# Patient Record
Sex: MALE | Race: BLACK OR AFRICAN AMERICAN | NOT HISPANIC OR LATINO | Employment: OTHER | URBAN - METROPOLITAN AREA
[De-identification: names, ages, dates, MRNs, and addresses within clinical notes are randomized per-mention and may not be internally consistent; named-entity substitution may affect disease eponyms.]

---

## 2017-01-27 ENCOUNTER — ALLSCRIPTS OFFICE VISIT (OUTPATIENT)
Dept: OTHER | Facility: OTHER | Age: 42
End: 2017-01-27

## 2017-06-27 ENCOUNTER — ALLSCRIPTS OFFICE VISIT (OUTPATIENT)
Dept: OTHER | Facility: OTHER | Age: 42
End: 2017-06-27

## 2017-11-28 ENCOUNTER — ALLSCRIPTS OFFICE VISIT (OUTPATIENT)
Dept: OTHER | Facility: OTHER | Age: 42
End: 2017-11-28

## 2017-11-29 NOTE — PROGRESS NOTES
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)   2  Bipolar disorder (manic depression) (296 80) (F31 9)   3  Weight loss (783 21) (R63 4)    Plan  Bipolar disorder (manic depression)    · Follow-up visit in 4 Months Evaluation and Treatment  Follow-up  Status: Hold For -Scheduling  Requested for: 30RIU0660   Ordered;Bipolar disorder (manic depression); Ordered By: Letitia Ch Performed:  Due: 63ILG6059    Discussion/Summary  Discussion Summary:   Patient is overall doing well  His exam remains stable  No chroea noted  Gait is mildly ataxic  He had one fall since the last visit however he attributes this to an issue with his shoe  He is still functioning well at his facility  He feels that his mood is stable  He is noted to have some weight loss since the last visit however it is still improved compared to 9/2016 when he was down to 180 pounds  He denies any issues with dysphagia  Recommend that he have Ensure or Boost supplements daily  If he continues to lose weight despite the supplements may have psychiatry consider increasing his Risperidone dose versus switching to Olanzapine  He was also encouraged to remain both physically and mentally stimulated  the case and plan with Dr Nataliia Greenwood  Counseling Documentation With Imm: The patient was counseled regarding instructions for management,-- prognosis,-- patient and family education,-- impressions,-- risks and benefits of treatment options  total time of encounter was 30 minutes-- and-- 16 minutes was spent counseling  Chief Complaint  Chief Complaint Free Text Note Form: Patient presents for a neurological follow up for [place] Disease      History of Present Illness  HPI: Rico Stinson is a 36year old [address] [place] [address] resident with Huntingtonâs disease diagnosed in 11/2013 after hospitalization for mental status changes, who presents for follow up   To review, he was admitted to the psychiatry unit Aug 14-23 for hopelessness, delirium and suicidal ideation and was seen by Dr Yamileth Azul who noted abnormal movements which were thought to be neuroleptic induced but Huntingtonâs disease was considered at that time  He was readmitted and seen by Dr Lemuel Canela 8/30/13 with a reported history of falls over a year, dysarthria and drooling  Given aggression in the past his risperidone was switched to Geodon and HD testing was ordered with 45 CAG repeats  In the past he had issues with weight loss and we and recommended having the psychiatrist consider either adding a low dose of Mirtazapine to boost his appetite or switching risperidone to olanzapine for possible weight gain  his last visit he was overall functioning well with no chorea  His gait was mildly ataxic with no recent fall  No changes were made and he remains on clonazepam 0 5mg 1tab bid (8:30am, 1:30pm) and 1mg qhs, risperidone 1 5mg q8:30am and 2mg qhs, benztropine 0 5mg bid, and carbamazepine 200mg bid and 100mg qhs   remains at [facility]  He continues to lose some weight  He is now down to 190 pounds however this is still improved compared to 9/2016 when he was 180 pounds  He denies any dysphagia  He states that he is eating well and did not think he had lost any weight  He is doing well from a mood standpoint as well  He still follows with psychiatry every month  He likes to watch sports and play chess at his facility  He had one fall since the last visit which he attributes to an issue with his shoe  No other falls  He is sleeping well at night  He is able to dress and shower on his own  He denies any concerns  his ROS, FH, Sh and social history  Review of Systems  Neurological ROS:  Constitutional: no fever, no chills, no recent weight gain, no recent weight loss, no complaints of feeling tired, no changes in appetite    HEENT:  no sinus problems, not feeling congested, no blurred vision, no dryness of the eyes, no eye pain, no hearing loss, no tinnitus, no mouth sores, no sore throat, no hoarseness, no dysphagia, no masses, no bleeding  Cardiovascular:  no chest pain or pressure, no palpitations present, the heart rate was not rapid or irregular, no swelling in the arms or legs, no poor circulation  Respiratory:  no unusual or persistant cough, no shortness of breath with or without exertion  Gastrointestinal:  no nausea, no vomiting, no diarrhea, no abdominal pain, no changes in bowel habits, no melena, no loss of bowel control  Genitourinary:  no incontinence, no feelings of urinary urgency, no increase in frequency, no urinary hesitancy, no dysuria, no hematuria  Musculoskeletal:  no arthralgias, no myalgias, no immobility or loss of function, no head/neck/back pain, no pain while walking  Integumentary  no masses, no rash, no skin lesions, no livedo reticularis  Psychiatric: anxiety,-- depression-- and-- mood swings  Endocrine  no unusual weight loss or gain, no excessive urination, no excessive thirst, no hair loss or gain, no hot or cold intolerance, no menstrual period change or irregularity, no loss of sexual ability or drive, no erection difficulty, no nipple discharge  Hematologic/Lymphatic:  no unusual bleeding, no tendency for easy bruising, no clotting skin or lumps  Neurological General:  no headache, no nausea or vomiting, no lightheadedness, no convulsions, no blackouts, no syncope, no trauma, no photopsia, no increased sleepiness, no trouble falling asleep, no snoring, no awakening at night  Neurological Mental Status:  no confusion, no mood swings, no alteration or loss of consciousness, no difficulty expressing/understanding speech, no memory problems  Neurological Cranial Nerves:  no blurry or double vision, no loss of vision, no face drooping, no facial numbness or weakness, no taste or smell loss/changes, no hearing loss or ringing, no vertigo or dizziness, no dysphagia, no slurred speech    Neurological Motor findings include:  no tremor, no twitching, no cramping(pre/post exercise), no atrophy  Neurological Coordination:  no unsteadiness, no vertigo or dizziness, no clumsiness, no problems reaching for objects  Neurological Sensory:  no numbness, no pain, no tingling, does not fall when eyes closed or taking a shower  Neurological Gait:  no difficulty walking, not falling to one side, no sensation of being pushed, has not had falls  Active Problems  1  Anxiety (300 00) (F41 9)   2  Ataxia (781 3) (R27 0)   3  Bipolar disorder (manic depression) (296 80) (F31 9)   4  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)    Family History  Father    1  Paternal family history unobtainable (V49 89) (Z78 9)    Social History     · Unknown If Ever Smoked    Current Meds   1  Acetaminophen 325 MG Oral Tablet; Therapy: (Recorded:04Nov2013) to Recorded   2  Benztropine Mesylate 0 5 MG Oral Tablet; TAKE 1 TABLET TWICE DAILY; Therapy: (Recorded:25Mar2014) to Recorded   3  CarBAMazepine 200 MG Oral Tablet; TAKE 1 TABLET Every 8 hours; Therapy: (Recorded:06Jun2016) to Recorded   4  ClonazePAM 0 5 MG Oral Tablet; TAKE 1 TABLET TWICE DAILY; Therapy: (Recorded:99Opk4295) to Recorded   5  ClonazePAM 1 MG Oral Tablet; 1 PO HS; Therapy: (Recorded:07Rsb4480) to Recorded   6  Milk of Magnesia 400 MG/5ML Oral Suspension; Therapy: (([phone]) to Recorded   7  RisperiDONE 1 MG Oral Tablet; Take 1 5 tablets every morning; Therapy: (Recorded:87Ehg5809) to Recorded   8  RisperiDONE 2 MG Oral Tablet; TAKE 1 TABLET AT BEDTIME; Therapy: (Recorded:63Mfl4540) to Recorded   9  Rulox SUSP; Take 30ml's 2 tablespoons every 4 hours as needed for gerd; Therapy: (Recorded:82Ecn1980) to Recorded    Allergies    1   No Known Drug Allergies    Vitals  Signs   Recorded: 27DMU2922 08:01AM   Heart Rate: 68, L Brachial Artery  Pulse Quality: Normal, L Brachial Artery  Respiration Quality: Normal  Respiration: 18  Systolic: 263, LUE, Sitting  Diastolic: 65, LUE, Sitting  Height: 5 ft 9 in  Weight: 190 lb 7 oz  BMI Calculated: 28 12  BSA Calculated: 2 02    Physical Exam   Constitutional  General appearance: Abnormal  -- (Sitting in chair  Pleasant  Mild dysarthria) well nourished-- and-- appearance reflects stated age  Musculoskeletal  Gait and station: Abnormal  -- (Mildly wide based  No retropulsion on pull test  Two step-offs on tandem walking)  Muscle strength: Normal strength throughout  Muscle tone: No atrophy, abnormal movements, flaccidity, cogwheeling or spasticity  Involuntary movements: Abnormal involuntary movements were observed  -- (UHDRS revealed severely slowed saccades with unsuppressible head movements horizontally and incomplete ocular pursuits vertically  Mild dysarthria  Mild motor impersistence with him unable to protrude tongue for greater than 10 seconds  Finger taps were moderately impaired on R>L  Moderate slowing on pronation/ supination bilaterally R>L  No rigidity  Mild dystonia of both upper extremities  Normal in lower  No bradykinesia  No facial chorea  No oral buccal chorea  No hand or leg chorea  LURIA: Able to perform 6 times on the right and 3 times on the left in 10 sec)  Neurologic  Orientation to person, place, and time: Normal    Recent and remote memory: Abnormal    Attention span and concentration: Normal thought process and attention span  Language: Abnormal   The patient achieved a score of 18 (1/27/17) on the MoCA  2nd cranial nerve: Normal    3rd, 4th, and 6th cranial nerve: Abnormal    5th cranial nerve: Normal    7th cranial nerve: Normal    8th cranial nerve: Normal    9th cranial nerve: Normal    11th cranial nerve: Normal    12th cranial nerve: Normal    Coordination: Abnormal        Attending Note  Collaborating Physician Note: Collaborating Note: I agree with the Advanced Practitioner note   I discussed the case with the Advanced Practitioner and reviewed the AP note      Signatures   Electronically signed by : Anmol Schaffer, [facility]; Nov 28 2017  8:41AM EST (Author)    Electronically signed by : Nellie Rodriguez MD; Nov 28 2017  8:45AM EST                       (Author) No

## 2018-01-13 VITALS — DIASTOLIC BLOOD PRESSURE: 62 MMHG | WEIGHT: 206 LBS | SYSTOLIC BLOOD PRESSURE: 108 MMHG | BODY MASS INDEX: 30.42 KG/M2

## 2018-01-14 VITALS
HEART RATE: 68 BPM | WEIGHT: 197.19 LBS | SYSTOLIC BLOOD PRESSURE: 113 MMHG | RESPIRATION RATE: 12 BRPM | DIASTOLIC BLOOD PRESSURE: 67 MMHG | BODY MASS INDEX: 29.12 KG/M2

## 2018-01-15 VITALS
BODY MASS INDEX: 28.21 KG/M2 | HEIGHT: 69 IN | DIASTOLIC BLOOD PRESSURE: 65 MMHG | WEIGHT: 190.44 LBS | RESPIRATION RATE: 18 BRPM | SYSTOLIC BLOOD PRESSURE: 160 MMHG | HEART RATE: 68 BPM

## 2018-03-20 ENCOUNTER — OFFICE VISIT (OUTPATIENT)
Dept: NEUROLOGY | Facility: CLINIC | Age: 43
End: 2018-03-20
Payer: COMMERCIAL

## 2018-03-20 VITALS
BODY MASS INDEX: 24.43 KG/M2 | WEIGHT: 165.4 LBS | RESPIRATION RATE: 14 BRPM | DIASTOLIC BLOOD PRESSURE: 54 MMHG | HEART RATE: 85 BPM | SYSTOLIC BLOOD PRESSURE: 96 MMHG

## 2018-03-20 DIAGNOSIS — G10 HUNTINGTON'S CHOREA (HCC): Primary | ICD-10-CM

## 2018-03-20 PROCEDURE — 3725F SCREEN DEPRESSION PERFORMED: CPT | Performed by: NURSE PRACTITIONER

## 2018-03-20 PROCEDURE — 99214 OFFICE O/P EST MOD 30 MIN: CPT | Performed by: NURSE PRACTITIONER

## 2018-03-20 RX ORDER — RISPERIDONE 2 MG/1
1 TABLET, FILM COATED ORAL
COMMUNITY
End: 2018-09-24 | Stop reason: SDUPTHER

## 2018-03-20 RX ORDER — RISPERIDONE 1 MG/1
1 TABLET, FILM COATED ORAL 2 TIMES DAILY
COMMUNITY
End: 2021-03-25

## 2018-03-20 RX ORDER — CLONAZEPAM 1 MG/1
1 TABLET ORAL
COMMUNITY
End: 2021-03-25

## 2018-03-20 RX ORDER — CLONAZEPAM 0.5 MG/1
1 TABLET ORAL 2 TIMES DAILY
COMMUNITY
End: 2021-03-25

## 2018-03-20 RX ORDER — CARBAMAZEPINE 200 MG/1
200 TABLET ORAL 2 TIMES DAILY
COMMUNITY
End: 2021-03-25

## 2018-03-20 RX ORDER — BISACODYL 10 MG
10 SUPPOSITORY, RECTAL RECTAL DAILY
COMMUNITY

## 2018-03-20 RX ORDER — BENZTROPINE MESYLATE 0.5 MG/1
1 TABLET ORAL 2 TIMES DAILY
COMMUNITY
End: 2021-03-25

## 2018-03-20 RX ORDER — ACETAMINOPHEN 325 MG/1
650 TABLET ORAL EVERY 4 HOURS PRN
COMMUNITY

## 2018-03-20 RX ORDER — MAGNESIUM HYDROXIDE/ALUMINUM HYDROXICE/SIMETHICONE 120; 1200; 1200 MG/30ML; MG/30ML; MG/30ML
SUSPENSION ORAL
COMMUNITY

## 2018-03-20 NOTE — PROGRESS NOTES
Patient ID: Manolo Brumfield is a 36 y o  male  Assessment/Plan:    Jai's chorea (HCC)  Overall stable-no chorea noted on exam  Mood has been stable per patient  I am concerned about continued weight loss-he is down from 181 to 170  He has not been taking the ensure supplements as he does not like them  I have recommended trying something like boost breeze, which is a juice supplement  I have also asked psych to look at his medications and consider possibly changing risperidone to olanzapine or add remeron in hopes of boosting appetite  He denies any issues with dysphagia and also feels that he has a good appetite  His gait continues to be mildly ataxic but he denies any recent falls  I have encouraged him to find activities to keep him both physically and mentally active  Subjective: Belgica Montez is a 36year old [facility] resident with Licking's disease diagnosed in 11/2013 after hospitalization for mental status changes, who presents for follow up  To review, he was admitted to the psychiatry unit Aug 14-23 for hopelessness, delirium and suicidal ideation and was seen by Dr Marylu Maloney who noted abnormal movements which were thought to be neuroleptic induced but Licking's disease was considered at that time  He was readmitted and seen by Dr Marielle Galvan 8/30/13 with a reported history of falls over a year, dysarthria and drooling  Given aggression in the past his risperidone was switched to Geodon and HD testing was ordered with 45 CAG repeats  In the past he had issues with weight loss and we and recommended having the psychiatrist consider either adding a low dose of Mirtazapine to boost his appetite or switching risperidone to olanzapine for possible weight gain  At his last visit he was noted to be doing well with no chorea and mildly ataxic gait, but overall was still functioning well   His medications remained unchanged at clonazepam 0 5mg 1tab bid (8:30am, 1:30pm) and 1mg qhs, risperidone 1 5mg q8:30am and 2mg qhs, benztropine 0 5mg bid, and carbamazepine 200mg bid and 100mg qhs  It was recommended that he use nutritional supplements for weight loss  Today Dony presents for follow-up  He denies issues and states he is doing well  No recent falls  No chorea or abnormal movements  No vision changes, dizziness, headaches, speech or swallowing difficulties  He reports that his mood is good and he is sleeping well at night  He likes to play on the computer and watch television at his facility  He is not doing much to stay physically active  He has also lost weight recently, down to 170 6 in March and he was 181 in December  He reports that he does not eat breakfast and has not been taking the ensure  He does not like the ensure  Objective:    Blood pressure 96/54, pulse 85, resp  rate 14, weight 75 kg (165 lb 6 4 oz)  Physical Exam   Constitutional: He appears well-developed and well-nourished  HENT:   Head: Normocephalic  Eyes: EOM are normal  Pupils are equal, round, and reactive to light  Psychiatric: He has a normal mood and affect  His behavior is normal    Vitals reviewed  Neurological Exam    Mental Status  The patient is alert  He has dysarthria of mild severity  He has normal attention span and concentration  He follows one step commands with prompting  He has a normal fund of knowledge  Cranial Nerves    CN II: The patient's visual acuity and visual fields are normal   CN III, IV, VI: The patient's pupils are equally round and reactive to light and ocular movements are normal   CN V: The patient has normal facial sensation  CN VII:  The patient has symmetric facial movement  CN VIII:  The patient's hearing is normal   CN IX, X: The patient has symmetric palate movement and normal gag reflex  CN XI: The patient's shoulder shrug strength is normal   CN XII: The patient's tongue is midline without atrophy or fasciculations      Motor   His strength is 5/5 in all four extremities except as noted  4/5 in BL hip flexors  No bradykinesia  No chorea noted on exam     Sensory  The patient's sensation is to light touch  Gait and Coordination   He has a shuffling stride  mildly ataxic gait         ROS:    Review of Systems   Constitutional:        Recent weight loss,    HENT: Negative  Eyes: Negative  Respiratory: Negative  Cardiovascular: Negative  Gastrointestinal: Negative  Endocrine: Negative  Genitourinary: Negative  Musculoskeletal: Negative  Skin: Negative  Allergic/Immunologic: Negative  Neurological: Negative  Hematological: Negative  Psychiatric/Behavioral: Negative  All other systems reviewed and are negative

## 2018-03-20 NOTE — PATIENT INSTRUCTIONS
I am concerned about his weight loss  I would recommend trying boost breeze or another type of juice supplement since Dony reports that he does not like the milkshake type products  Otherwise, the psychiatrist could consider adding remeron to boost appetite or change the risperidone to olanzapine to try and do the same  I would encourage Dony to eat breakfast as well, even if it's something small or a supplement of some sort  I would also like to see him stay physically active

## 2018-09-24 ENCOUNTER — TELEPHONE (OUTPATIENT)
Dept: NEUROLOGY | Facility: CLINIC | Age: 43
End: 2018-09-24

## 2018-09-24 ENCOUNTER — OFFICE VISIT (OUTPATIENT)
Dept: NEUROLOGY | Facility: CLINIC | Age: 43
End: 2018-09-24
Payer: COMMERCIAL

## 2018-09-24 VITALS
BODY MASS INDEX: 22.48 KG/M2 | DIASTOLIC BLOOD PRESSURE: 66 MMHG | HEIGHT: 70 IN | WEIGHT: 157 LBS | SYSTOLIC BLOOD PRESSURE: 102 MMHG

## 2018-09-24 DIAGNOSIS — G10 HUNTINGTON'S CHOREA (HCC): Primary | ICD-10-CM

## 2018-09-24 DIAGNOSIS — R63.4 WEIGHT LOSS: ICD-10-CM

## 2018-09-24 PROCEDURE — 99215 OFFICE O/P EST HI 40 MIN: CPT | Performed by: PSYCHIATRY & NEUROLOGY

## 2018-09-24 RX ORDER — CARBAMAZEPINE 100 MG/1
100 TABLET, EXTENDED RELEASE ORAL
COMMUNITY
End: 2021-03-25

## 2018-09-24 RX ORDER — MIRTAZAPINE 15 MG/1
15 TABLET, FILM COATED ORAL
COMMUNITY
End: 2021-03-25

## 2018-09-24 RX ORDER — RISPERIDONE 0.5 MG/1
0.5 TABLET, FILM COATED ORAL
COMMUNITY
End: 2021-03-25

## 2018-09-24 RX ORDER — ESCITALOPRAM OXALATE 10 MG/1
10 TABLET ORAL DAILY
COMMUNITY
End: 2021-03-25

## 2018-09-24 NOTE — PROGRESS NOTES
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[facility] [address] )  Chorea controlled main symptoms are dystonia and imbalance  Will repeat MOCA on follow up  Diagnoses and all orders for this visit:    Jai's chorea ([address] )    Weight loss    Other orders  -     risperiDONE (RisperDAL) 0 5 mg tablet; Take 0 5 mg by mouth 1 tab po along with 1mg tab in the am  -     carBAMazepine (TEGretol XR) 100 mg 12 hr tablet; Take 100 mg by mouth daily at bedtime  -     mirtazapine (REMERON) 15 mg tablet; Take 15 mg by mouth daily at bedtime  -     escitalopram (LEXAPRO) 10 mg tablet; Take 10 mg by mouth daily       I have spent 40 minutes with Patient  today in which greater than 50% of this time was spent in counseling/coordination of care regarding Risks and benefits of tx options, Intructions for management and Impressions  Subjective: Deidre Joshua is a 39year old [place] resident with Jai's disease diagnosed in 11/2013 after hospitalization for mental status changes, who presents for follow up  To review, he was admitted to the psychiatry unit Aug 14-23 for hopelessness, delirium and suicidal ideation and was seen by Dr Ary Shanks who noted abnormal movements which were thought to be neuroleptic induced but Jai's disease was considered at that time  He was readmitted and seen by Dr Belem Eisenberg 8/30/13 with a reported history of falls over a year, dysarthria and drooling  Given aggression in the past his risperidone was switched to Geodon and HD testing was ordered with 45 CAG repeats   In the past he had issues with weight loss and we and recommended having the psychiatrist consider either adding a low dose of Mirtazapine to boost his appetite or switching risperidone to olanzapine for possible weight gain  At his last visit he was noted to be doing well with no chorea and mildly ataxic gait, but overall was still functioning well  His medications remained unchanged at clonazepam 0 5mg 1tab bid (8:30am, 1:30pm) and 1mg qhs, risperidone 1 5mg q8:30am and 2mg qhs, benztropine 0 5mg bid, and carbamazepine 200mg bid and 100mg qhs  It was recommended that he use nutritional supplements for weight loss  He denies issues   No recent falls  No chorea or abnormal movements  He is sleeping well at night  He likes to play chess on the computer and watch sports at his facility  He continues to lose weight  He is 157 lbs today  He was 181 in December 2017 and 165lb in March 2018  Hedoes not eat breakfast but states he has always been like this  He has been eating all his lunch and dinner he tells me  Lunch is the big meal  He snacks on peanut and jelly at 9:30pm for snack  He also eats cookies and drinks  It does not seem like he is getting nutritional supplements anymore  He denies any dysphagia or coughing with meals or drinks  He feels his appetite is fine and states he completes all meals  He continues on risperidone 1 5mg qam and 2mg qhs for mood which controls chorea  Also on clonazepam, mirtazepine, carbamezapine for mood  He is on benztropine 0 5mg at 8:30 and 16:30 it states for EPS movements  I question whether this is needed  He denies feeling depressed or anxious  He denies any suicidal or homicidal ideation  No obsessions or compulsions  He is able to perform ADL's independently and report no trouble with this  The following portions of the patient's history were reviewed and updated as appropriate: allergies, current medications, past family history, past medical history, past social history and past surgical history  Objective:    Blood pressure 102/66, height 5' 10" (1 778 m), weight 71 2 kg (157 lb)  Physical Exam   Constitutional: He appears well-developed     Eyes: EOM are normal  Pupils are equal, round, and reactive to light  Neurological: He has normal strength  Psychiatric: His speech is normal    Vitals reviewed  Neurological Exam    Mental Status  The patient is alert and oriented to person, place, time, and situation  His speech is normal  His language is fluent with no aphasia  He follows multi-step commands  Cranial Nerves  CN I: The patient has not tested  CN II: The patient's visual acuity and visual fields are normal   CN III, IV, VI: The patient's pupils are equally round and reactive to light and ocular movements are normal   CN V: The patient has normal facial sensation  CN VII:  The patient has symmetric facial movement  CN VIII:  The patient's hearing is normal   CN IX, X: The patient has symmetric palate movement  CN XI: The patient's shoulder shrug strength is normal   CN XII: The patient's tongue is midline without atrophy or fasciculations  Slowed ocular movements     Motor   His overall muscle tone is normal throughout  His strength is 5/5 throughout all four extremities  Sensory  The patient's sensation is to light touch  Gait and Coordination    Slightly wide based, shuffling at times with festination  Left > right dystonia, arm > leg  UHDRS 37   Ocular pursuit- horizontal 2   Ocular pursuit-vertical 2   Saccade initiation- horizontal 2   Saccade initiation- vertical 2   Saccade Velocity- horizontal 2   Saccade Velocity-Vertical 2   Dysarthria 0   Tongue protrusion 2   Finger taps- right 2   Finger taps - left 2   Pronate/supinate- left 2   Pronate Supinate-right 2   Fist-Hand-Palm sequence 1   Rigidity- arms- right 0   Rigidity-arms-left 0   Bradykinesia 1   Maximal dystonia- trunk 1   RUE 2   LUE 3   RLE 1   LLE 1   Maximal Chorea- Face 0   LESLY 0   Trunk 0   RUE 0   LUE 0   RLE 0   LLE 0   Gait 2   Tandem walk 2   Retropulsion 1         ROS:    Review of Systems   Constitutional: Negative    Negative for appetite change and fever    HENT: Negative  Negative for hearing loss, tinnitus, trouble swallowing and voice change  Eyes: Negative  Negative for photophobia and pain  Respiratory: Negative  Negative for shortness of breath  Cardiovascular: Negative  Negative for palpitations  Gastrointestinal: Negative  Negative for nausea and vomiting  Endocrine: Negative  Negative for cold intolerance and heat intolerance  Genitourinary: Negative  Negative for dysuria, frequency and urgency  Musculoskeletal: Negative  Negative for myalgias and neck pain  Skin: Negative  Negative for rash  Neurological: Negative  Negative for dizziness, tremors, seizures, syncope, facial asymmetry, speech difficulty, weakness, light-headedness, numbness and headaches  Hematological: Negative  Does not bruise/bleed easily  Psychiatric/Behavioral: Negative  Negative for confusion, hallucinations and sleep disturbance

## 2018-09-24 NOTE — PATIENT INSTRUCTIONS
Staff to encourage him to complete meals and snacks  They are to call should he have coughing or choking while eating and we will order swallow study  No chorea  Dystonia and gait are main motor symptoms  He is on benztropine which it states is being given for EPS  Unclear if this is still needed  EPS may have been chorea at onset which has been better controlled on low dose risperdal  Will try to find out who is psychiatrist it to send my note and thoughts

## 2018-09-24 NOTE — ASSESSMENT & PLAN NOTE
Staff to encourage him to complete meals and snacks  They are to call should he have coughing or choking while eating and we will order swallow study

## 2018-09-24 NOTE — TELEPHONE ENCOUNTER
Keyon David from [facility] [place] called re: risperdal  Due to change of the dose, she is requesting a new script for Risperdal  Pls fax script to [phone]  Keyon David states that pt has been taking carbamazepine for a long time  She wants to double check w/ you if you really want to discontinue this med? She states that she received a message that you called [facility] and requesting to speak w/ pt's psychiatrist  Fartun Murcia can call her @ [phone] if you have any questions or concerns          Thank you

## 2018-09-26 ENCOUNTER — TELEPHONE (OUTPATIENT)
Dept: NEUROLOGY | Facility: CLINIC | Age: 43
End: 2018-09-26

## 2018-09-26 NOTE — TELEPHONE ENCOUNTER
Received call from Rachel Foss at [facility] stating that the aftervisit summary stated to D/C the tegretol, questioning this order  According to your note it does not look like any medication changes were recommended  Please advise  They were advised to not many any med changes until we called back

## 2018-09-27 NOTE — TELEPHONE ENCOUNTER
I did not make any changes in his medications  They should keep risperdal and carbamezapine the same  I was only questioning if benztropine should be discontinued and wanted his psychiatrist to get my note so this could be addressed

## 2018-09-27 NOTE — TELEPHONE ENCOUNTER
Called [facility], spoke w/ Tonia Costello and advised of all of the below  She verbalized understanding  She states that your note stated that risperdal was decreased and tegretol was discontinued  Prior to office visit on 9/24/18, pt was taking risperal 1 5 mg qam and 2 mg at bedtime  Carbamezapine  200 mg, 1 tab bid and Carbamezapine  mg at bedtime  Per aftervisit summary it states that pt to take Risperal  1 5 qam   And carbamazepine was discontinued  Tonia Costello wants to know if pt should stay on risperal 1 5 mg qam and 2 mg at bedtime   Carbamezapine 200 mg, 1 tab bid and Carbamezapine  mg at bedtime     pls see encounter 9/26/18

## 2018-09-28 NOTE — TELEPHONE ENCOUNTER
Silverio Crocker from [place] called to see if we received faxed progress note they faxed over earlier today to [facility] Fax #  I advised her that I would check and she get interrupting when I was trying to review the information in encounter with her  I advised that we are waiting to hear back from Dr Mina Tony  She started raising her voice and demanding to know if fax was received  Again, I advised that I would have to check as it is not in pt's chart  Call disconnected

## 2018-10-01 NOTE — TELEPHONE ENCOUNTER
Called and advised Jacinda Grossman at [place] ([phone]) and advised her no changes to medications  She verbalized understanding

## 2018-10-01 NOTE — TELEPHONE ENCOUNTER
I am not sure why the aftervisit summary would populate those instructions  I can only think that may this was automatically populated from changes made in the chart when the medications were updated by the MA? Not sure but like I said earlier he should remain on risperdal 1 5qam and 2mg qhs and carbamezapine 200mg bid and XR at bedtime  I did not make any changes

## 2019-02-15 ENCOUNTER — TELEPHONE (OUTPATIENT)
Dept: NEUROLOGY | Facility: CLINIC | Age: 44
End: 2019-02-15

## 2019-02-15 NOTE — TELEPHONE ENCOUNTER
----- Message from Macho sent at 2/14/2019  9:02 AM EST -----  Contact: [phone]  Good morning,    Can you please give Сергей Gomes from [facility] a call to schedule a follow up with Dr Mckayla Peguero in [place]? I looked but no openings for OVS/OVL  Thank you so much!

## 2019-02-18 NOTE — TELEPHONE ENCOUNTER
Spoke to Siddharth at [facility] and scheduled patient for 4/24/19 @ 11am with Dr Cj Neil in the [facility] location

## 2019-04-24 ENCOUNTER — OFFICE VISIT (OUTPATIENT)
Dept: NEUROLOGY | Facility: CLINIC | Age: 44
End: 2019-04-24
Payer: COMMERCIAL

## 2019-04-24 VITALS — SYSTOLIC BLOOD PRESSURE: 129 MMHG | DIASTOLIC BLOOD PRESSURE: 60 MMHG

## 2019-04-24 DIAGNOSIS — G10 HUNTINGTON'S CHOREA (HCC): Primary | ICD-10-CM

## 2019-04-24 PROCEDURE — 99214 OFFICE O/P EST MOD 30 MIN: CPT | Performed by: PSYCHIATRY & NEUROLOGY

## 2020-02-03 ENCOUNTER — TELEPHONE (OUTPATIENT)
Dept: NEUROLOGY | Facility: CLINIC | Age: 45
End: 2020-02-03

## 2020-02-03 NOTE — TELEPHONE ENCOUNTER
[facility] COMMUNITY HEALTHCHOICES (CURRENTLY NOT PAR) PER REG  TIPS SHEET PROVIDED BY MANAGEMENT PLEASE PROCEED AS STATED ON REG  TIP SHEET  1/30/2020 LMOM FOR [employer] (BERTOING) TO CALL  BACK REGARDING PT 'S CURRENT INS   2/3/2020 MADE EUSEBIO AT [facility] AWARE THAT WE DO NOT CURRENTLY PAR W/[employer] HEALTH CHOICES- I DID MAKE [employer] AWARE THAT WE DO PAR W/[facility] HEALTHCHOICES PER [employer]SE[employer](BILLIING) SHE STATES BILL TO [facility] AND SHE WILL SPEAK W/STAFF REGARDING CHANGING INS  TO ONE THAT WE DO PAR W/

## 2020-02-04 ENCOUNTER — OFFICE VISIT (OUTPATIENT)
Dept: NEUROLOGY | Facility: CLINIC | Age: 45
End: 2020-02-04
Payer: COMMERCIAL

## 2020-02-04 VITALS — DIASTOLIC BLOOD PRESSURE: 77 MMHG | HEART RATE: 68 BPM | SYSTOLIC BLOOD PRESSURE: 121 MMHG

## 2020-02-04 DIAGNOSIS — G10 HUNTINGTON'S CHOREA (HCC): Primary | ICD-10-CM

## 2020-02-04 PROCEDURE — 99214 OFFICE O/P EST MOD 30 MIN: CPT | Performed by: PHYSICIAN ASSISTANT

## 2020-02-04 RX ORDER — ACETAMINOPHEN 650 MG/1
TABLET, FILM COATED, EXTENDED RELEASE ORAL
COMMUNITY
Start: 2020-01-22

## 2020-02-04 NOTE — PROGRESS NOTES
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[address] )  Patient continues with progression of his gait and imbalance  He requires assistance with walking, unable to safely use the walker per therapy  His risperidone was recently reduced per psychiatry and it appears he is tolerating this change well  No complaints of chorea or abnormal movements  Discussed with the nursing staff and have asked that they contact our office if they start to notice any chorea on the lower dose of risperidone  He is now off of benztropine as well with no clear change in his exam   Will not make any further changes at his time  He will continue to follow closely with psychiatry  Subjective: Poncho Willett is a [address] [place] [address] 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, who presents for follow up  To review, he was admitted to the psychiatry unit Aug 14-23 for hopelessness, delirium and suicidal ideation and was seen by Dr Pawel Darling who noted abnormal movements which were thought to be neuroleptic induced but Mosquero's disease was considered at that time  He was readmitted and seen by Dr Nicki Perrin 8/30/13 with a reported history of falls over a year, dysarthria and drooling  Given aggression in the past his risperidone was switched to Geodon and HD testing was ordered with 45 CAG repeats  In the past he had issues with weight loss and we and recommended having the psychiatrist consider either adding a low dose of Mirtazapine to boost his appetite or switching risperidone to olanzapine for possible weight gain  At his last visit with Dr Demi Rosas his chorea remained well controlled with risperidone and no changes were made to that dose  He was also taking benztropine for prior EPS movements and discussed discontinuing this medication to see if it was still needed   Per his medication list it appears his risperidone has been decreased since the last visit  He is currently taking 1mg bid (had been taking 1 5mg qam and 2mg qhs)  This medication is managed by his psychiatrist for mood but it also controls chorea  He is also taking clonazepam, mirtazepine 15mg qhs, escitalopram 10mg daily and carbamezapine for mood  He presents today with an aid from [facility]  He feels that he has been doing well  He has no concerns today  Spoke with the nursing staff at the facility  No chorea or abnormal movements noted even despite the lower dose of risperidone  His mood is well with his current medications, he is still followed by psychiatry  He requires assistance with dressing and showering  He tends to spend most of his time in bed or in his room  He requires assistance and a gait belt when ambulating  He does have some occasional falls mainly because he tries to get out of bed on his own without assistance  No issues with swallowing  He sleeps well at night and tends to not want to get up in the AM as well  He feels that his mood has been stable  He denies feeling depressed or anxious  He denies any suicidal or homicidal ideation  No obsessions or compulsions  I personally reviewed and updated the ROS  Objective:    Blood pressure 121/77, pulse 68  Physical Exam   Constitutional: He appears well-developed and well-nourished  HENT:   Right Ear: Hearing normal    Left Ear: Hearing normal    Pulmonary/Chest: Effort normal    Neurological: He is alert  He has normal strength  Neurological Exam  Mental Status  Awake and alert  Oriented to person, place, time and situation  Moderate dysarthria present  Follows complex commands  Cranial Nerves  CN III, IV, VI: Hypometric saccades  CN V:  Right: Facial sensation is normal   Left: Facial sensation is normal on the left  CN VII:  Right: There is no facial weakness  Left: There is no facial weakness    CN VIII:  Right: Hearing is normal   Left: Hearing is normal   CN IX, X: Palate elevates symmetrically  CN XI: Shoulder shrug strength is normal   CN XII: Tongue midline without atrophy or fasciculations  Motor   Strength is 5/5 throughout all four extremities  Sensory  Light touch is normal in upper and lower extremities  Coordination  Right: Rapid alternating movement abnormality:  Left: Rapid alternating movement abnormality:  See [facility]   Gait  Casual gait: Unable to rise from chair without using arms          [facility]  4/24/19 2/4/20    Ocular pursuit- horizontal 2  2   Ocular pursuit-vertical 2  2   Saccade initiation- horizontal 2  2   Saccade initiation- vertical 2  2   Saccade Velocity- horizontal 2  2   Saccade Velocity-Vertical 2  2   Dysarthria 1  2   Tongue protrusion 2  2   Finger taps- right 3  3   Finger taps - left 2  2   Pronate/supinate- left 3  3   Pronate Supinate-right 2  2   Fist-Hand-Palm sequence 3  3   Rigidity- arms- right 0  0   Rigidity-arms-left 0  0   Bradykinesia 2  2   Maximal dystonia- trunk 2  2   RUE 1  1   LUE 2  1   RLE 2  2   LLE 3  3   Maximal Chorea- Face 0  0   LESLY 0  0   Trunk 0  0   RUE 0  0   LUE 0  0   RLE 0  0   LLE 0  0   Gait 2  3   Tandem walk 3  3   Retropulsion 2  2         ROS:    Review of Systems   Constitutional: Positive for fatigue  Negative for appetite change and fever  HENT: Negative  Negative for hearing loss, tinnitus, trouble swallowing and voice change  Eyes: Negative  Negative for photophobia and pain  Respiratory: Negative  Negative for shortness of breath  Cardiovascular: Negative  Negative for palpitations  Gastrointestinal: Negative  Negative for nausea and vomiting  Endocrine: Negative  Negative for cold intolerance and heat intolerance  Genitourinary: Negative  Negative for dysuria, frequency and urgency  Musculoskeletal: Positive for gait problem  Negative for myalgias and neck pain  Skin: Negative  Negative for rash  Allergic/Immunologic: Negative  Neurological: Positive for speech difficulty and weakness  Negative for dizziness, tremors, seizures, syncope, facial asymmetry, light-headedness, numbness and headaches  Hematological: Negative  Does not bruise/bleed easily  Psychiatric/Behavioral: Negative  Negative for confusion, hallucinations and sleep disturbance  All other systems reviewed and are negative

## 2020-02-04 NOTE — ASSESSMENT & PLAN NOTE
Patient continues with progression of his gait and imbalance  He requires assistance with walking, unable to safely use the walker per therapy  His risperidone was recently reduced per psychiatry and it appears he is tolerating this change well  No complaints of chorea or abnormal movements  Discussed with the nursing staff and have asked that they contact our office if they start to notice any chorea on the lower dose of risperidone  He is now off of benztropine as well with no clear change in his exam   Will not make any further changes at his time  He will continue to follow closely with psychiatry

## 2020-02-04 NOTE — PATIENT INSTRUCTIONS
Patient continues with progression of his gait and imbalance  He requires assistance with walking, unable to safely use the walker per therapy  His risperidone was recently reduced per psychiatry and it appears he is tolerating this change well  No complaints of chorea or abnormal movements  Discussed with the nursing staff and have asked that they contact our office if they start to notice any chorea on the lower dose of risperidone  No chorea noted on exam today  He is now off of benztropine as well with no clear change in his exam   Will not make any further changes at his time  He will continue to follow closely with psychiatry

## 2020-06-03 ENCOUNTER — TRANSCRIBE ORDERS (OUTPATIENT)
Dept: NEUROLOGY | Facility: CLINIC | Age: 45
End: 2020-06-03

## 2020-06-03 DIAGNOSIS — G10 HUNTINGTON'S DISEASE (HCC): Primary | ICD-10-CM

## 2020-07-01 ENCOUNTER — TELEPHONE (OUTPATIENT)
Dept: NEUROLOGY | Facility: CLINIC | Age: 45
End: 2020-07-01

## 2020-07-01 NOTE — TELEPHONE ENCOUNTER
Yenny Malin from [address] [place] [address] changed pt appt to telephone visit [address] Resident

## 2020-07-02 ENCOUNTER — TELEPHONE (OUTPATIENT)
Dept: NEUROLOGY | Facility: CLINIC | Age: 45
End: 2020-07-02

## 2020-07-02 ENCOUNTER — TELEMEDICINE (OUTPATIENT)
Dept: NEUROLOGY | Facility: CLINIC | Age: 45
End: 2020-07-02
Payer: COMMERCIAL

## 2020-07-02 DIAGNOSIS — G10 HUNTINGTON'S DISEASE (HCC): ICD-10-CM

## 2020-07-02 DIAGNOSIS — G10 HUNTINGTON'S CHOREA (HCC): Primary | ICD-10-CM

## 2020-07-02 PROCEDURE — G2012 BRIEF CHECK IN BY MD/QHP: HCPCS | Performed by: PSYCHIATRY & NEUROLOGY

## 2020-07-02 NOTE — ASSESSMENT & PLAN NOTE
Abbreviated visit this am as patient was sleepy over the phone  Progression with regards to balance and requiring more assist with ADL's  Will obtain medication list for review  No changes suggested at this time  Needs daytime stimulation  Follows with psychiatry as well

## 2020-07-02 NOTE — PROGRESS NOTES
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[address] )                    Reason for visit is follow up for HD  Chief Complaint   Patient presents with   [facility] Brief Visit        Encounter provider Radha Condon MD    Provider located at [address] [place] [id number]    Recent Visits  Date Type Provider Dept   07/01/20 Telephone Atilio Whitney [facility]   Showing recent visits within past 7 days and meeting all other requirements     Today's Visits  Date Type Provider Dept   07/02/20 Laron Jovel MD [facility] today's visits and meeting all other requirements     Future Appointments  No visits were found meeting these conditions  Showing future appointments within next 150 days and meeting all other requirements        After connecting through telephone, the patient was identified by name and date of birth  Garry Judd was informed that this is a telemedicine visit and that the visit is being conducted through telephone  My office door was closed  No one else was in the room  He acknowledged consent and understanding of privacy and security of the platform  The patient has agreed to participate and understands he can discontinue the visit at any time  Patient is aware this is a billable service     It was my intent to perform this visit via video technology but the patient was not able to do a video connection so the visit was completed via audio telephone only  Subjective    Janneth Done is a 54 y o  male   [facility] 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, who presents for follow up  To review, he was admitted to the psychiatry unit Aug 14-23 for hopelessness, delirium and suicidal ideation and was seen by Dr Twan Starkey who noted abnormal movements which were thought to be neuroleptic induced but Jai's disease was considered at that time  He was readmitted and seen by Dr Yahaira Mabry 8/30/13 with a reported history of falls over a year, dysarthria and drooling  Given aggression in the past his risperidone was switched to Geodon and HD testing was ordered with 45 CAG repeats  In the past he had issues with weight loss and we and recommended having the psychiatrist consider either adding a low dose of Mirtazapine to boost his appetite or switching risperidone to olanzapine for possible weight gain  He has progressed since last seen  He now needs one assist for ambulating  He is assisted with all activities of daily living  He sleeps a lot day and night  Overall he is stating he is fine with no concerns  No chorea or abnormal movements noted  He has a good appetite  He denies any dysphagia  Mood has been stable  He denies feeling depressed or anxious  He is irritable with staff at times  He will refuse to let them groom him  Past Medical History:   Diagnosis Date    [facility]     Epilepsy [facility]     History of falling     Clermont disease [facility]     Lack of coordination     Schizoaffective disorder ([facility] )        No past surgical history on file      Current Outpatient Medications   Medication Sig Dispense Refill    acetaminophen (TYLENOL) 325 mg tablet Take 650 mg by mouth 2 (two) times a day        bisacodyl (BISCOLAX) 10 mg suppository Insert 10 mg into the rectum daily      carBAMazepine (TEGretol XR) 100 mg 12 hr tablet Take 100 mg by mouth daily at bedtime      carBAMazepine (TEGretol) 200 mg tablet Take 200 mg by mouth 2 (two) times a day        clonazePAM (KlonoPIN) 0 5 mg tablet Take 1 tablet by mouth 2 (two) times a day      clonazePAM (KlonoPIN) 1 mg tablet Take 1 mg by mouth daily at bedtime        escitalopram (LEXAPRO) 10 mg tablet Take 10 mg by mouth daily      magnesium hydroxide (MILK OF MAGNESIA) 400 mg/5 mL oral suspension Take by mouth      MAPAP ARTHRITIS PAIN 650 MG CR tablet       mirtazapine (REMERON) 15 mg tablet Take 15 mg by mouth daily at bedtime      risperiDONE (RisperDAL) 1 mg tablet Take 1 mg by mouth 2 (two) times a day        aluminum-magnesium hydroxide-simethicone (RULOX) 200-200-20 mg/5 mL suspension Take by mouth      benztropine (COGENTIN) 0 5 mg tablet Take 1 tablet by mouth 2 (two) times a day      risperiDONE (RisperDAL) 0 5 mg tablet Take 0 5 mg by mouth 1 tab po along with 1mg tab in the am       No current facility-administered medications for this visit  No Known Allergies    Review of Systems   Unable to perform ROS: Other (sleepy and not responsive to all questions  )   HENT: Negative for trouble swallowing  Neurological: Positive for speech difficulty  Negative for tremors  Psychiatric/Behavioral: Positive for agitation  Negative for hallucinations and sleep disturbance  There were no vitals filed for this visit  As a result of this visit, I have not referred the patient for further respiratory evaluation  I spent 10 minutes with patient today in which greater than 50% of the time was spent in counseling/coordination of care regarding impression and on going plan  VIRTUAL VISIT DISCLAIMER    Emmanuelle Coates acknowledges that he has consented to an online visit or consultation   He understands that the online visit is based solely on information provided by him, and that, in the absence of a face-to-face physical evaluation by the physician, the diagnosis he receives is both limited and provisional in terms of accuracy and completeness  This is not intended to replace a full medical face-to-face evaluation by the physician  Josué Blake understands and accepts these terms

## 2020-07-02 NOTE — TELEPHONE ENCOUNTER
Called [facility] to schedule patient's 5 month follow up  Left message for them to call back   Mailed AVS

## 2020-07-06 ENCOUNTER — TELEPHONE (OUTPATIENT)
Dept: NEUROLOGY | Facility: CLINIC | Age: 45
End: 2020-07-06

## 2020-11-20 ENCOUNTER — TELEPHONE (OUTPATIENT)
Dept: SLEEP CENTER | Facility: CLINIC | Age: 45
End: 2020-11-20

## 2020-11-23 ENCOUNTER — TELEMEDICINE (OUTPATIENT)
Dept: NEUROLOGY | Facility: CLINIC | Age: 45
End: 2020-11-23
Payer: COMMERCIAL

## 2020-11-23 DIAGNOSIS — G10 HUNTINGTON'S CHOREA (HCC): Primary | ICD-10-CM

## 2020-11-23 PROCEDURE — 99441 PR PHYS/QHP TELEPHONE EVALUATION 5-10 MIN: CPT | Performed by: PSYCHIATRY & NEUROLOGY

## 2020-11-23 RX ORDER — QUETIAPINE FUMARATE 25 MG/1
25 TABLET, FILM COATED ORAL
COMMUNITY
End: 2021-03-25

## 2020-11-23 RX ORDER — LEVOTHYROXINE SODIUM 0.03 MG/1
25 TABLET ORAL DAILY
COMMUNITY

## 2020-12-07 ENCOUNTER — TELEPHONE (OUTPATIENT)
Dept: NEUROLOGY | Facility: CLINIC | Age: 45
End: 2020-12-07

## 2020-12-11 ENCOUNTER — HOSPITAL ENCOUNTER (EMERGENCY)
Facility: HOSPITAL | Age: 45
Discharge: NURSING, REHAB OR LONG-TERM CARE | End: 2020-12-11
Attending: EMERGENCY MEDICINE | Admitting: EMERGENCY MEDICINE
Payer: COMMERCIAL

## 2020-12-11 VITALS
DIASTOLIC BLOOD PRESSURE: 87 MMHG | BODY MASS INDEX: 26.48 KG/M2 | SYSTOLIC BLOOD PRESSURE: 127 MMHG | HEIGHT: 70 IN | OXYGEN SATURATION: 99 % | RESPIRATION RATE: 18 BRPM | HEART RATE: 65 BPM | WEIGHT: 185 LBS | TEMPERATURE: 97.3 F

## 2020-12-11 DIAGNOSIS — R46.89 AGGRESSIVE BEHAVIOR: Primary | ICD-10-CM

## 2020-12-11 PROCEDURE — 99284 EMERGENCY DEPT VISIT MOD MDM: CPT

## 2020-12-11 PROCEDURE — 99284 EMERGENCY DEPT VISIT MOD MDM: CPT | Performed by: EMERGENCY MEDICINE

## 2020-12-11 RX ORDER — LORAZEPAM 1 MG/1
1 TABLET ORAL EVERY 6 HOURS PRN
Qty: 30 TABLET | Refills: 0 | Status: SHIPPED | OUTPATIENT
Start: 2020-12-11 | End: 2021-03-25

## 2021-01-01 ENCOUNTER — TELEPHONE (OUTPATIENT)
Dept: NEUROLOGY | Facility: CLINIC | Age: 46
End: 2021-01-01

## 2021-01-01 ENCOUNTER — NURSING HOME VISIT (OUTPATIENT)
Dept: GERIATRICS | Facility: OTHER | Age: 46
End: 2021-01-01
Payer: COMMERCIAL

## 2021-01-01 ENCOUNTER — DOCUMENTATION (OUTPATIENT)
Dept: GERIATRICS | Facility: OTHER | Age: 46
End: 2021-01-01

## 2021-01-01 DIAGNOSIS — F25.0 SCHIZOAFFECTIVE DISORDER, BIPOLAR TYPE (HCC): Primary | ICD-10-CM

## 2021-01-01 DIAGNOSIS — G10 HUNTINGTON'S CHOREA (HCC): ICD-10-CM

## 2021-01-01 DIAGNOSIS — F25.0 SCHIZOAFFECTIVE DISORDER, BIPOLAR TYPE (HCC): ICD-10-CM

## 2021-01-01 DIAGNOSIS — G10 HUNTINGTON'S CHOREA (HCC): Primary | ICD-10-CM

## 2021-01-01 DIAGNOSIS — F02.81 DEMENTIA ASSOCIATED WITH OTHER UNDERLYING DISEASE WITH BEHAVIORAL DISTURBANCE (HCC): ICD-10-CM

## 2021-01-01 DIAGNOSIS — F41.9 ANXIETY DISORDER, UNSPECIFIED TYPE: ICD-10-CM

## 2021-01-01 DIAGNOSIS — R45.1 AGITATION: ICD-10-CM

## 2021-01-01 PROCEDURE — 99309 SBSQ NF CARE MODERATE MDM 30: CPT | Performed by: NURSE PRACTITIONER

## 2021-01-01 RX ORDER — CHLORPROMAZINE HYDROCHLORIDE 50 MG/1
50 TABLET, FILM COATED ORAL 3 TIMES DAILY
COMMUNITY

## 2021-01-01 RX ORDER — PALIPERIDONE 3 MG/1
3 TABLET, EXTENDED RELEASE ORAL 2 TIMES DAILY
COMMUNITY

## 2021-01-01 RX ORDER — PALIPERIDONE 6 MG/1
6 TABLET, EXTENDED RELEASE ORAL
COMMUNITY

## 2021-03-25 PROBLEM — F03.90 DEMENTIA (HCC): Status: ACTIVE | Noted: 2021-03-25

## 2021-03-25 PROBLEM — R45.1 AGITATION: Status: ACTIVE | Noted: 2021-01-31

## 2021-03-25 PROBLEM — F25.9 SCHIZOAFFECTIVE DISORDER (HCC): Status: ACTIVE | Noted: 2021-03-25

## 2021-03-25 RX ORDER — CARBAMAZEPINE 200 MG/1
400 TABLET ORAL 2 TIMES DAILY
COMMUNITY
Start: 2021-02-12 | End: 2022-01-01

## 2021-03-25 RX ORDER — CLONAZEPAM 0.5 MG/1
TABLET ORAL EVERY 8 HOURS
COMMUNITY
End: 2021-03-25

## 2021-03-25 RX ORDER — HALOPERIDOL 0.5 MG/1
2.5 TABLET ORAL 2 TIMES DAILY
COMMUNITY
Start: 2021-02-13 | End: 2021-01-01

## 2021-03-25 RX ORDER — LORAZEPAM 2 MG/1
2 TABLET ORAL
COMMUNITY
Start: 2021-02-12 | End: 2021-03-25

## 2021-03-25 RX ORDER — LORAZEPAM 1 MG/1
1 TABLET ORAL 2 TIMES DAILY
COMMUNITY
Start: 2021-02-12 | End: 2021-01-01

## 2021-03-25 RX ORDER — BENZTROPINE MESYLATE 1 MG/1
1 TABLET ORAL 2 TIMES DAILY
COMMUNITY

## 2021-03-25 RX ORDER — LORAZEPAM 2 MG/1
2 TABLET ORAL 3 TIMES DAILY
COMMUNITY

## 2021-03-26 ENCOUNTER — NURSING HOME VISIT (OUTPATIENT)
Dept: GERIATRICS | Facility: OTHER | Age: 46
End: 2021-03-26
Payer: COMMERCIAL

## 2021-03-26 DIAGNOSIS — F25.0 SCHIZOAFFECTIVE DISORDER, BIPOLAR TYPE (HCC): ICD-10-CM

## 2021-03-26 DIAGNOSIS — F02.81 DEMENTIA ASSOCIATED WITH OTHER UNDERLYING DISEASE WITH BEHAVIORAL DISTURBANCE (HCC): Primary | ICD-10-CM

## 2021-03-26 DIAGNOSIS — R63.4 WEIGHT LOSS: ICD-10-CM

## 2021-03-26 DIAGNOSIS — R45.1 AGITATION: ICD-10-CM

## 2021-03-26 DIAGNOSIS — G10 HUNTINGTON'S CHOREA (HCC): ICD-10-CM

## 2021-03-26 PROCEDURE — 99305 1ST NF CARE MODERATE MDM 35: CPT | Performed by: PSYCHIATRY & NEUROLOGY

## 2021-03-31 NOTE — PROGRESS NOTES
[address] [place] [address]  POS: 32: [facility]- [facility] Term, [place]     PSYCHIATRIC EVALUATION     NAME: Karson Zacarias  AGE: 54 y o  SEX: male [phone]    DATE OF ENCOUNTER: 3/26/2021      Assessment and Plan      Diagnosis ICD-10-CM Associated Orders   1  Dementia associated with other underlying disease with behavioral disturbance ([facility] )  F02 81    2  Willacy's chorea (HCC)  G10    3  Weight loss  R63 4    4  Agitation  R45 1    5  Schizoaffective disorder, bipolar type ([facility] 75 )  F25 0        All medications and routine orders were reviewed and updated as needed  Evaluation of Psychotropic Drugs for possible gradual dose reductions    Psychotropic medications have been reviewed  Patient continues with symptoms of agitation, mood lability and cognitive function decline as noted below  Any dose reductions at this time would be clinically contraindicated, as it would be likely to cause worsening of symptoms  Treatment Recommendations/Precautions:    Continue with current psych meds regimen  Medication management every 4 weeks   Continue monitoring behavior and provide frequent redirection and support  Medications Risks/Benefits:      Risks, Benefits And Possible Side Effects Of Medications:    Risks, benefits, and possible side effects of medications explained to Dony and he verbalizes limited understanding but agrees to treatment at this time  Controlled Medication Discussion:     Lynne Hyde has been filling controlled prescriptions on time as prescribed according to [facility]    Chief Complaint     Seen for Psychiatric Consult  at Nursing Facility/Assisted Living    History of Present Illness     Chart was reviewed  Patient was seen and examined in his room      Patient is a 54 years male, with past history of Schizoaffective disorder, Willacy disease, dementia, anxiety, epilepsy who was recently treated at [facility] psychiatric unit in 2021 due to increased agitation, aggressive and inappropriate behavior  On exam, patient was seen in his bed, exhibiting significant choreiform movement related to his Clare disease  He was able to be engaged but unable to provide elaborate information of his current medical and mental conditions due to high degree of limitation in his cognitive function capacity  He was alert and oriented in person and place, recall 1/3 and scored approximately 12/30 in MMSE  Patient stated his brother  and he could not contact his mother who is currently living in a nursing home in [place]  Although patient appears somewhat guarded and suspicious, he denied any current depressive symptoms, suicidal ideation, manic or psychotic symptoms  Patient stated that he was treated for depression and anxiety in the hospital but has no insight into his Clare disease, cognitive function decline and its prognosis     At this time, patient lacks decision-making capacity for his medical, financial and appropriate self-care due to his major neurocognitive disorder related to Clare disease and Schizoaffective disorder      The following portions of the patient's history were reviewed and updated as appropriate: allergies, current medications, past family history, past medical history, past social history, past surgical history and problem list     Past Psychiatric History:     Past Inpatient Psychiatric Treatment:   Several past inpatient psychiatric admissions  Past Outpatient Psychiatric Treatment:    Was in outpatient psychiatric treatment in the past with a psychiatrist  Past Suicide Attempts: denies  Past Violent Behavior: yes  Past Psychiatric Medication Trials: multiple psychiatric medication trials    Traumatic History:     Abuse: none  Other Traumatic Events: none    HISTORY:  Past Medical History:   Diagnosis Date    [facility]ipolar disorder ([facility] )     [facility]     History of falling     [place] disease ([facility] )     Lack of coordination     Schizoaffective disorder ([facility] )      Family History   Problem Relation Age of Onset    No Known Problems Mother     No Known Problems Father      Social History     Socioeconomic History    Marital status: Single     Spouse name: Not on file    Number of children: Not on file    Years of education: Not on file    Highest education level: Not on file   Occupational History    Not on file   Social Needs    Financial resource strain: Not on file    Food insecurity     Worry: Not on file     Inability: Not on file    Transportation needs     Medical: Not on file     Non-medical: Not on file   Tobacco Use    Smoking status: Never Smoker    Smokeless tobacco: Never Used   Substance and Sexual Activity    Alcohol use: No    Drug use: No    Sexual activity: Not on file   Lifestyle    Physical activity     Days per week: Not on file     Minutes per session: Not on file    Stress: Not on file   Relationships    Social connections     Talks on phone: Not on file     Gets together: Not on file     Attends Scientologist service: Not on file     Active member of club or organization: Not on file     Attends meetings of clubs or organizations: Not on file     Relationship status: Not on file    Intimate partner violence     Fear of current or ex partner: Not on file     Emotionally abused: Not on file     Physically abused: Not on file     Forced sexual activity: Not on file   Other Topics Concern    Not on file   Social History Narrative    Not on file       Allergies:  No Known Allergies    Review of Systems     Review of Systems   Neurological: Positive for tremors  Psychiatric/Behavioral: Positive for behavioral problems and sleep disturbance  The patient is nervous/anxious  Patient shows choreiform movement of upper extremities greater than lower extremities        Medications and orders     Continue with current psych meds regimen Haldol, Ativan, Tegretol and Cogentin      Objective     Mental Status Evaluation:    Appearance age appropriate, casually dressed   Behavior mildly anxious, guarded   Speech circumstantial, slurred   Mood irritable   Affect reactive   Thought Processes circumstantial, perseverative   Associations concrete associations, perseveration   Thought Content some paranoia   Perceptual Disturbances: no auditory hallucinations, no visual hallucinations   Abnormal Thoughts  Risk Potential Suicidal ideation - None  Homicidal ideation - None  Potential for aggression - No   Orientation oriented to person and place   Memory recent memory moderately impaired, remote memory mildly impaired   Consciousness alert and awake   Attention Span Concentration Span decreased attention span  decreased concentration   Intellect appears to be below average intelligence   Insight poor   Judgement poor   Muscle Strength and  Gait in bed   Motor Activity abnormal movement noted: chorea present   Language no difficulty naming common objects, no difficulty repeating a phrase   Fund of Knowledge adequate knowledge of current events  impaired fund of knowledge regarding past history               Physical Exam  Psychiatric:         Mood and Affect: Mood is anxious  Affect is labile  Speech: Speech is delayed  Thought Content: Thought content is delusional          Cognition and Memory: Cognition is impaired  Memory is impaired  Judgment: Judgment is impulsive  Pertinent Laboratory/Diagnostic Studies:   I have personally reviewed pertinent lab results  Counseling / Coordination of Care  Total floor / unit time spent today 35 minutes  Greater than 50% of total time was spent with the patient and / or family counseling and / or coordination of care       Mora Beal MD 03/31/21

## 2022-10-25 NOTE — ASSESSMENT & PLAN NOTE
Overall stable-no chorea noted on exam  Mood has been stable per patient  I am concerned about continued weight loss-he is down from 181 to 170  He has not been taking the ensure supplements as he does not like them  I have recommended trying something like boost breeze, which is a juice supplement  I have also asked psych to look at his medications and consider possibly changing risperidone to olanzapine or add remeron in hopes of boosting appetite  He denies any issues with dysphagia and also feels that he has a good appetite  His gait continues to be mildly ataxic but he denies any recent falls  I have encouraged him to find activities to keep him both physically and mentally active  Sarecycline Counseling: Patient advised regarding possible photosensitivity and discoloration of the teeth, skin, lips, tongue and gums.  Patient instructed to avoid sunlight, if possible.  When exposed to sunlight, patients should wear protective clothing, sunglasses, and sunscreen.  The patient was instructed to call the office immediately if the following severe adverse effects occur:  hearing changes, easy bruising/bleeding, severe headache, or vision changes.  The patient verbalized understanding of the proper use and possible adverse effects of sarecycline.  All of the patient's questions and concerns were addressed.